# Patient Record
Sex: FEMALE | Race: WHITE | Employment: UNEMPLOYED | URBAN - METROPOLITAN AREA
[De-identification: names, ages, dates, MRNs, and addresses within clinical notes are randomized per-mention and may not be internally consistent; named-entity substitution may affect disease eponyms.]

---

## 2023-01-01 ENCOUNTER — APPOINTMENT (OUTPATIENT)
Dept: CT IMAGING | Age: 88
End: 2023-01-01
Payer: MEDICARE

## 2023-01-01 ENCOUNTER — HOSPITAL ENCOUNTER (EMERGENCY)
Age: 88
Discharge: HOME | End: 2023-01-01
Attending: EMERGENCY MEDICINE
Payer: MEDICARE

## 2023-01-01 VITALS
RESPIRATION RATE: 16 BRPM | HEART RATE: 91 BPM | DIASTOLIC BLOOD PRESSURE: 63 MMHG | SYSTOLIC BLOOD PRESSURE: 175 MMHG | OXYGEN SATURATION: 100 % | TEMPERATURE: 97.8 F

## 2023-01-01 DIAGNOSIS — S16.1XXA STRAIN OF NECK MUSCLE, INITIAL ENCOUNTER: ICD-10-CM

## 2023-01-01 DIAGNOSIS — S09.90XA CLOSED HEAD INJURY WITHOUT LOSS OF CONSCIOUSNESS, INITIAL ENCOUNTER: Primary | ICD-10-CM

## 2023-01-01 DIAGNOSIS — S01.01XA LACERATION OF SCALP, INITIAL ENCOUNTER: ICD-10-CM

## 2023-01-01 PROCEDURE — 12002 RPR S/N/AX/GEN/TRNK2.6-7.5CM: CPT

## 2023-01-01 PROCEDURE — 6370000000 HC RX 637 (ALT 250 FOR IP): Performed by: PHYSICIAN ASSISTANT

## 2023-01-01 PROCEDURE — 70450 CT HEAD/BRAIN W/O DYE: CPT

## 2023-01-01 PROCEDURE — 99284 EMERGENCY DEPT VISIT MOD MDM: CPT

## 2023-01-01 PROCEDURE — 6360000002 HC RX W HCPCS: Performed by: PHYSICIAN ASSISTANT

## 2023-01-01 PROCEDURE — 90471 IMMUNIZATION ADMIN: CPT | Performed by: PHYSICIAN ASSISTANT

## 2023-01-01 PROCEDURE — 72125 CT NECK SPINE W/O DYE: CPT

## 2023-01-01 PROCEDURE — 90714 TD VACC NO PRESV 7 YRS+ IM: CPT | Performed by: PHYSICIAN ASSISTANT

## 2023-01-01 RX ORDER — ACETAMINOPHEN 500 MG
1000 TABLET ORAL ONCE
Status: COMPLETED | OUTPATIENT
Start: 2023-01-01 | End: 2023-01-01

## 2023-01-01 RX ADMIN — CLOSTRIDIUM TETANI TOXOID ANTIGEN (FORMALDEHYDE INACTIVATED) AND CORYNEBACTERIUM DIPHTHERIAE TOXOID ANTIGEN (FORMALDEHYDE INACTIVATED) 0.5 ML: 5; 2 INJECTION, SUSPENSION INTRAMUSCULAR at 16:56

## 2023-01-01 RX ADMIN — ACETAMINOPHEN 1000 MG: 500 TABLET ORAL at 16:55

## 2023-01-01 ASSESSMENT — PAIN SCALES - GENERAL
PAINLEVEL_OUTOF10: 5
PAINLEVEL_OUTOF10: 4
PAINLEVEL_OUTOF10: 0

## 2023-01-01 ASSESSMENT — ENCOUNTER SYMPTOMS
VOMITING: 0
ABDOMINAL PAIN: 0
CHEST TIGHTNESS: 0
NAUSEA: 0
DIARRHEA: 0
BACK PAIN: 0
SHORTNESS OF BREATH: 0

## 2023-01-01 ASSESSMENT — LIFESTYLE VARIABLES
HOW MANY STANDARD DRINKS CONTAINING ALCOHOL DO YOU HAVE ON A TYPICAL DAY: PATIENT DOES NOT DRINK
HOW OFTEN DO YOU HAVE A DRINK CONTAINING ALCOHOL: NEVER

## 2023-01-01 ASSESSMENT — PAIN DESCRIPTION - LOCATION: LOCATION: HEAD

## 2023-01-01 ASSESSMENT — PAIN - FUNCTIONAL ASSESSMENT: PAIN_FUNCTIONAL_ASSESSMENT: 0-10

## 2023-01-01 ASSESSMENT — PAIN DESCRIPTION - DESCRIPTORS: DESCRIPTORS: ACHING

## 2023-01-01 NOTE — ED PROVIDER NOTES
I have personally performed a face to face diagnostic evaluation on this patient. I have fully participated in the care of this patient I personally saw the patient and performed a substantive portion of the visit including all aspects of the medical decision making. I have reviewed and agree with all pertinent clinical information including history, physical exam, diagnostic tests, and the plan. HPI: Coco Concepcion presented with fall from standing height. Patient noted that she had on socks and was on a hardwood floor and lost her balance. Was witnessed. Despite triage note patient denies any syncopal symptoms no LOC. Patient remembers falling. Significant wound laceration to the back of the head. No other symptoms no numbness no weakness no vision changes. See KARINA note for further details  Chief Complaint   Patient presents with    Fall     Patient in by [facility] em[facility] from home, witnessed fall, unsure if patient had syncopal episode, doesn't remember falling      Review of Systems: See KARINA note  Vital Signs: BP (!) 175/63   Pulse 91   Temp 97.8 °F (36.6 °C)   Resp 16   SpO2 100%     Alert 82 y.o. female who does not appear toxic or acutely ill  HENT: Six centimeter linear laceration to the occipital skull  Neck: Grossly normal ROM  Chest/Lungs: respiratory effort normal   Abdomen: Soft nontender  Musculoskeletal: Grossly normal ROM  Skin: No palor or diaphoresis    Medical Decision Making and Plan:  Pertinent Labs & Imaging studies reviewed. (See KARINA chart for details)  I agree with KARINA assessment and plan. Patient with witnessed mechanical fall. Patient not on blood thinners afebrile not tachycardic saturating well on room air slightly hypertensive. Neuro exam is unremarkable. Wound was repaired. See KARINA note for further details. Strict return precautions given for any new or worsening symptoms as well as instructions for monitoring mental status. Patient lives with family.   Any new or worsening symptoms patient return immediately to the ER.     I personally saw the patient and independently provided 0 minutes of nonconcurrent critical care out of the total shared critical care time provided        Oliva Concepcion MD  01/01/23 2255

## 2023-01-01 NOTE — ED NOTES
Patient able to eat and drink without difficulty  Able to take steps without difficulty  Family at bedside and updated on plan of care     Olga Dawson RN  01/01/23 3312

## 2023-01-01 NOTE — DISCHARGE INSTRUCTIONS
Staples out in 10 to 12 days. WOUND INSTRUCTIONS   KEEP AREA DRY FOR 24 HOURS THEN OK TO [address] AND GET WET WITH DAILY BATHING. PLACE ANTIBIOTIC OINTMENT ON WOUND. REPEAT DAILY. How to Care for Your  Staples         Stitches (sutures), staples and skin adhesive strips hold the skin together as it heals. HOME CARE     Ø Wash your hands with soap and water before you touch your wound. Ø Only use medicine or ointment ordered by your doctor. Ø Change the dressing daily. Ø Cover your wound and keep covered. Ø Wash your hands again after touching your wound. Ø Do not get your stitches dirty. If they get dirty, dab them gently with a clean washcloth. Wet the washcloth with soapy water. Do not rub. Pat them dry gently. Ø Do not take out your own stitches or staples. Your skin adhesive strips will fall off by themselves. Skin adhesive strips usually fall off within 7 days. Do not pick at the wound. Picking can cause an infection. Ø Do not miss your follow-up appointment. Ø If you have problems or questions, call your:  l Doctor.  l Clinic.  l Nurse where you were a patient. GET HELP RIGHT AWAY IF:     Ø Fever over 101º F (38 º C) develops. Ø Chills develop. Ø You have redness or pain around your staples. Ø There is swelling around your staples. Ø You notice drainage from your staples. Ø There is a foul smell from your wound. Document Released: 10/15/2010  Document Re-Released: 01/09/2011  [employer]® Patient Information ©2011 Raul Cuello.

## 2023-01-01 NOTE — ED PROVIDER NOTES
[facility]        Pt Name: Hayley Cedeno  MRN: [id number]  [place] 1935  Date of evaluation: 1/1/2023  Provider: Jose Leung PA-C  PCP: Lauran Pallas  Note Started: 4:29 PM EST 1/1/23       I have seen and evaluated this patient with my supervising physician Genia Fox MD.      [facility]       Chief Complaint   Patient presents with    Fall     Patient in by [facility] from home, witnessed fall, unsure if patient had syncopal episode, doesn't remember falling       HISTORY OF PRESENT ILLNESS: 1 or more Elements     History from : Patient    Limitations to history : None    Hayley Cedeno is a 82 y.o. female who presents to the emergency department today via ambulance from home stating she was walking from her living room to her kitchen and believes her socks slipped on the hardwood floor and she fell backwards. She states she hit the back of her head on the ground. She denies losing consciousness. She states she was lightheaded briefly after the fall. She denies any preceding lightheadedness or dizziness. She denies having any chest pain or shortness of breath before the fall. She is alert and oriented x3 with GCS 15 on arrival.  She denies having any lightheadedness or dizziness at this time. She denies headache but states the back of her head hurts where she has a laceration. She states her neck is sore. She denies lower back pain. She denies changes in bowel or bladder habits, incontinence, saddle paresthesia, numbness, tingling or weakness in her extremities. She denies having any chest pain or shortness of breath at this time. She has had no nausea or vomiting since the fall. Nursing Notes were all reviewed and agreed with or any disagreements were addressed in the HPI. REVIEW OF SYSTEMS :      Review of Systems   Constitutional:  Negative for chills and fever.    Respiratory:  Negative for chest tightness and shortness of breath. Cardiovascular:  Negative for chest pain. Gastrointestinal:  Negative for abdominal pain, diarrhea, nausea and vomiting. Genitourinary:  Negative for difficulty urinating, dysuria, flank pain, frequency, hematuria and urgency. Musculoskeletal:  Positive for neck pain and neck stiffness. Negative for arthralgias, back pain and joint swelling. Skin:  Positive for wound. Neurological:  Negative for dizziness, tremors, seizures, syncope, facial asymmetry, speech difficulty, weakness, light-headedness, numbness and headaches. All other systems reviewed and are negative. Positives and Pertinent negatives as per HPI. SURGICAL HISTORY   History reviewed. No pertinent surgical history. CURRENTMEDICATIONS       Previous Medications    No medications on file       ALLERGIES     Patient has no known allergies. FAMILYHISTORY     History reviewed. No pertinent family history. SOCIAL HISTORY       Social History     Tobacco Use    Smoking status: Never    Smokeless tobacco: Never       SCREENINGS        John Coma Scale  Eye Opening: Spontaneous  Best Verbal Response: Oriented  Best Motor Response: Obeys commands  [place] Coma Scale Score: 15                CIWA Assessment  BP: (!) 161/65  Heart Rate: 89           PHYSICAL EXAM  1 or more Elements     ED Triage Vitals [01/01/23 1619]   BP Temp Temp src Heart Rate Resp SpO2 Height Weight   (!) 161/65 97.8 °F (36.6 °C) -- 89 16 100 % -- --       Physical Exam  Vitals and nursing note reviewed. Constitutional:       Appearance: She is well-developed. She is not diaphoretic. HENT:      Head: Normocephalic. Laceration present. Comments: Patient has a 6 cm laceration over the back of her scalp. There is no obvious crepitus or deformity. Nose: Nose normal.      Mouth/Throat:      Mouth: Mucous membranes are moist.   Eyes:      General:         Right eye: No discharge. Left eye: No discharge. Extraocular Movements: Extraocular movements intact. Conjunctiva/sclera: Conjunctivae normal.      Pupils: Pupils are equal, round, and reactive to light. Cardiovascular:      Rate and Rhythm: Normal rate and regular rhythm. Pulses: Normal pulses. Heart sounds: Normal heart sounds. Pulmonary:      Effort: Pulmonary effort is normal. No respiratory distress. Breath sounds: Normal breath sounds. Abdominal:      General: Abdomen is flat. Bowel sounds are normal.      Palpations: Abdomen is soft. Musculoskeletal:         General: Normal range of motion. Cervical back: Normal range of motion and neck supple. Skin:     General: Skin is warm and dry. Coloration: Skin is not pale. Neurological:      Mental Status: She is alert and oriented to person, place, and time. GCS: GCS eye subscore is 4. GCS verbal subscore is 5. GCS motor subscore is 6. Cranial Nerves: Cranial nerves 2-12 are intact. Sensory: Sensation is intact. Motor: Motor function is intact. Coordination: Coordination is intact. Psychiatric:         Behavior: Behavior normal.           DIAGNOSTIC RESULTS   LABS:    Labs Reviewed - No data to display    When ordered only abnormal lab results are displayed. All other labs were within normal range or not returned as of this dictation. EKG: When ordered, EKG's are interpreted by the Emergency Department Physician in the absence of a cardiologist.  Please see their note for interpretation of EKG. RADIOLOGY:   Non-plain film images such as CT, Ultrasound and MRI are read by the radiologist. Plain radiographic images are visualized and preliminarily interpreted by the ED Provider with the below findings:        Interpretation per the Radiologist below, if available at the time of this note:    CT CERVICAL SPINE WO CONTRAST   Final Result   1. No acute cervical spine fracture.    2. Spinal degenerative changes as described with multilevel central canal and   neural foraminal narrowing. Canal narrowing is most prominent at least   moderate at C5-C6 and C6-C7. 3. Kyphosis of the spine which could be related to patient positioning,   spinal degenerative changes or muscle spasm. 4. Areas of mild malalignment which are likely degenerative with mild   anterolisthesis C3-C4 and C4-C5 and retrolisthesis C5-C6. CT HEAD WO CONTRAST   Final Result   No acute intracranial findings. Volume loss and chronic microvascular ischemic changes.                PROCEDURES   Unless otherwise noted below, none     Lac Repair    Date/Time: 1/1/2023 5:54 PM  Performed by: Adam Alcocer PA-C  Authorized by: Adam Alcocer PA-C     Consent:     Consent obtained:  Verbal    Consent given by:  Patient    Risks, benefits, and alternatives were discussed: yes      Risks discussed:  Infection, need for additional repair, poor cosmetic result and poor wound healing    Alternatives discussed:  No treatment  Universal protocol:     Patient identity confirmed:  Verbally with patient  Anesthesia:     Anesthesia method:  Local infiltration    Local anesthetic:  Lidocaine 1% WITH epi  Laceration details:     Location:  Scalp    Scalp location:  Occipital    Length (cm):  6  Pre-procedure details:     Preparation:  Patient was prepped and draped in usual sterile fashion and imaging obtained to evaluate for foreign bodies  Exploration:     Limited defect created (wound extended): no      Imaging outcome: foreign body not noted      Wound exploration: wound explored through full range of motion and entire depth of wound visualized      Contaminated: no    Treatment:     Area cleansed with:  Chlorhexidine    Amount of cleaning:  Extensive    Irrigation solution:  Sterile water    Irrigation method:  Syringe    Visualized foreign bodies/material removed: no      Debridement:  None  Skin repair:     Repair method:  Staples    Number of staples:  12  Approximation: Approximation:  Close  Repair type:     Repair type:  Simple  Post-procedure details:     Dressing:  Antibiotic ointment    Procedure completion:  Tolerated    CRITICAL CARE TIME (.cctime)       PAST MEDICAL HISTORY      has no past medical history on file. Chronic Conditions affecting Care: None    EMERGENCY DEPARTMENT COURSE and DIFFERENTIAL DIAGNOSIS/MDM:   Vitals:    Vitals:    01/01/23 1619   BP: (!) 161/65   Pulse: 89   Resp: 16   Temp: 97.8 °F (36.6 °C)   SpO2: 100%       Patient was given the following medications:  Medications   lidocaine-EPINEPHrine 1 percent-1:[id number] injection (has no administration in time range)   tetanus & diphtheria toxoids (adult) 5-2 LFU injection 0.5 mL (0.5 mLs IntraMUSCular Given 1/1/23 1656)   acetaminophen (TYLENOL) tablet 1,000 mg (1,000 mg Oral Given 1/1/23 1655)             Is this patient to be included in the SEP-1 Core Measure due to severe sepsis or septic shock? No   Exclusion criteria - the patient is NOT to be included for SEP-1 Core Measure due to: Infection is not suspected    CONSULTS: (Who and What was discussed)  None  Discussion with Other Profesionals : None    Social Determinants : None    Records Reviewed : None    CC/HPI Summary, DDx, ED Course, and Reassessment: Patient presented to the emergency department today via ambulance from home after she reports she had a mechanical fall. She states she was wearing socks and slipped on her hardwood floor. She fell backwards and hit the back of her scalp. She was with family. She denies losing consciousness but states she did feel lightheaded after she hit her head. She is alert and oriented x3 with GCS 15 on arrival.  She dates she only has mild pain around the laceration on the back of her scalp as well as some stiffness of her neck. She denies lightheadedness, dizziness, vision changes, numbness, tingling or weakness in her extremities. She denies ever having any chest pain or shortness of breath. She does have a sizable laceration over the occiput measuring 6 cm. This is thoroughly irrigated and repaired with 12 staples. Her tetanus is updated. CT head and cervical spine completed show no acute intracranial abnormality. There are spinal degenerative changes with no acute cervical spine fracture. Patient is receiving a p.o. challenge at this time. Nursing staff is going to walk her to see how she feels. As this is the end of my shift the attending physician will continue care of this patient. Neli Gallegos was signed out in stable condition. Please see Anmol Potts MD note for further details, including diagnosis and disposition. FINAL IMPRESSION      1. Closed head injury without loss of consciousness, initial encounter    2. Laceration of scalp, initial encounter    3. Strain of neck muscle, initial encounter          DISPOSITION/PLAN     DISPOSITION        PATIENT REFERRED TO:  No follow-up provider specified.     DISCHARGE MEDICATIONS:  New Prescriptions    No medications on file       DISCONTINUED MEDICATIONS:  Discontinued Medications    No medications on file              (Please note that portions of this note were completed with a voice recognition program.  Efforts were made to edit the dictations but occasionally words are mis-transcribed.)    Milton Simmons PA-C (electronically signed)           Milton Simmons PA-C  01/01/23 1823